# Patient Record
Sex: MALE | Race: WHITE
[De-identification: names, ages, dates, MRNs, and addresses within clinical notes are randomized per-mention and may not be internally consistent; named-entity substitution may affect disease eponyms.]

---

## 2020-06-24 NOTE — ULT
BILATERAL CAROTID DUPLEX ULTRASOUND:



HISTORY: Right-sided carotid bruit



TECHNIQUE:

Grayscale, color-flow and spectral Doppler ultrasound imaging of the extracranial carotid artery syst
ems was performed bilaterally.



FINDINGS:

There is plaque formation in the carotid bulbs and proximal ICAs.



The peak systolic velocity in the right ICA measures 128 cm/s with an end-diastolic velocity of 30 cm
/s and a systolic ratio of 0.72.



The peak systolic velocity in the left ICA measures  140  cm/s with an end-diastolic velocity of  33 
 cm/s and a systolic ratio of 0.82.



Flow in both vertebral arteries remains antegrade.



IMPRESSION: Moderate (50-69%) stenosis involving both ICAs.





Reported By: Jamil Levine 

Electronically Signed:  6/24/2020 1:20 PM

## 2020-07-07 ENCOUNTER — HOSPITAL ENCOUNTER (OUTPATIENT)
Dept: HOSPITAL 92 - BICMRI | Age: 70
Discharge: HOME | End: 2020-07-07
Attending: FAMILY MEDICINE
Payer: COMMERCIAL

## 2020-07-07 DIAGNOSIS — H91.92: Primary | ICD-10-CM

## 2020-07-07 PROCEDURE — 70551 MRI BRAIN STEM W/O DYE: CPT

## 2020-07-07 NOTE — MRI
MRI BRAIN WITHOUT CONTRAST:

 

Date:  07/07/2020

 

HISTORY:  

Hearing loss. 

 

FINDINGS:

Absence of IV contrast reduces the sensitivity of exam, particularly to evaluate for masses and abnor
mal nerve enhancement. 

 

There are a few foci of T2 prolongation in the periventricular white matter consistent with mild 
nelly small vessel ischemic disease. No restricted diffusion is seen. No blood products are noted on th
e gradient echo sequences. The noncontrasted high resolution images of the IACs demonstrate no defini
te mass. No evidence of acute infarct, hemorrhage, midline shift, or abnormal extra-axial fluid colle
ctions are seen. The visualized paranasal sinuses and mastoid air cells are well aerated. The ventric
ular size is appropriate and the basilar cisterns are patent. 

 

IMPRESSION: 

No evidence of acute intracranial process. 

 

 

POS: [facility]